# Patient Record
Sex: MALE | URBAN - METROPOLITAN AREA
[De-identification: names, ages, dates, MRNs, and addresses within clinical notes are randomized per-mention and may not be internally consistent; named-entity substitution may affect disease eponyms.]

---

## 2019-11-07 ENCOUNTER — OFFICE VISIT (OUTPATIENT)
Dept: URBAN - METROPOLITAN AREA CLINIC 11 | Facility: CLINIC | Age: 64
End: 2019-11-07
Payer: COMMERCIAL

## 2019-11-07 DIAGNOSIS — E11.9 TYPE 2 DIABETES MELLITUS W/O COMPLICATION: Primary | ICD-10-CM

## 2019-11-07 DIAGNOSIS — H53.041 AMBLYOPIA SUSPECT, RIGHT EYE: ICD-10-CM

## 2019-11-07 DIAGNOSIS — H35.443 AGE-RELATED RETICULAR DEGENERATION OF RETINA, BILATERAL: ICD-10-CM

## 2019-11-07 PROCEDURE — 92134 CPTRZ OPH DX IMG PST SGM RTA: CPT | Performed by: OPTOMETRIST

## 2019-11-07 PROCEDURE — 92004 COMPRE OPH EXAM NEW PT 1/>: CPT | Performed by: OPTOMETRIST

## 2019-11-07 ASSESSMENT — INTRAOCULAR PRESSURE
OS: 13
OD: 13

## 2019-11-07 NOTE — IMPRESSION/PLAN
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 OU. Plan: Pt ed re: condition, OCT performed and reviewed. Recommend yearly dilated evaluation, no tx at this time.

## 2019-11-07 NOTE — IMPRESSION/PLAN
Impression: Type 2 diabetes mellitus w/o complication: N36.9. OU. Plan: No Non-Proliferative Diabetic Retinopathy, no Diabetic Macular Edema and no Neovascularization of the iris, disc, or elsewhere. OCT performed and reviewed today, no sign of diabetic macular edema OU. Discussed ocular and systemic benefits of blood sugar control. Send notes to PCP. Check annually. RTC 1 year x CEE.

## 2019-11-07 NOTE — IMPRESSION/PLAN
Impression: Amblyopia suspect, right eye: H53.041. OD. Plan: Hx of lazy eye per pt, patching, glasses wear and decreased vision OD. Recommend glasses for optimal vision, pt defers at this time. Check yearly.